# Patient Record
Sex: FEMALE | Race: WHITE | Employment: FULL TIME | URBAN - METROPOLITAN AREA
[De-identification: names, ages, dates, MRNs, and addresses within clinical notes are randomized per-mention and may not be internally consistent; named-entity substitution may affect disease eponyms.]

---

## 2019-09-21 ENCOUNTER — HISTORICAL (OUTPATIENT)
Dept: ADMINISTRATIVE | Facility: HOSPITAL | Age: 54
End: 2019-09-21

## 2022-04-10 ENCOUNTER — HISTORICAL (OUTPATIENT)
Dept: ADMINISTRATIVE | Facility: HOSPITAL | Age: 57
End: 2022-04-10

## 2022-04-24 VITALS
DIASTOLIC BLOOD PRESSURE: 69 MMHG | OXYGEN SATURATION: 98 % | WEIGHT: 114.44 LBS | SYSTOLIC BLOOD PRESSURE: 105 MMHG | HEIGHT: 64 IN | BODY MASS INDEX: 19.54 KG/M2

## 2022-05-03 NOTE — HISTORICAL [facility] CERNER
This is a historical note converted from Alejandrina. Formatting and pictures may have been removed.  Please reference Alejandrina for original formatting and attached multimedia. Chief Complaint  left hand pain from injury at gym. happened in may  History of Present Illness  53-year-old white female presents to clinic complaining of left hand pain. ?Patient states in May she was at the gym?adjusting the seat seat slipped and brushed?against her left hand. ?Patient states there was a little bit of swelling?near the second digit?MCP and fifth digit MCP.? But?nothing significant. ?Patient states there was no bruising. 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 ?However recently the hand has begun to hurt again. ?Patient states the pain is most noticeable when she is grasping items especially grasping a door handle to open a door. ?Pain is not constant it is a dull pain is nonradiating no numbness no tingling?pain is in the same area as the swelling that occurred back in May  Review of Systems  Constitutional_Per HPI  Eye_Per HPI  ENMT_Per HPI  Cardiovascular_Per HPI  Respiratory: Per HPI  Gastrointestinal_Per HPI  Muscular_Per HPI  Neuro: Per HPI  Physical Exam  Vitals & Measurements  T:?36.9? ?C (Oral)? HR:?64(Peripheral)? RR:?16? BP:?105/69? SpO2:?98%?  HT:?162?cm? WT:?51.9?kg? BMI:?19.78?  General_well-developed well-nourished in no acute distress  Musculoskeletal_left hand is?without swelling?erythema?or ecchymosis.? 5 out of 5  strength. ?No no tenderness with palpation of the anatomical snuffbox no pain with axial loading of the thumb. ?Full range of motion of?fingers hand wrist.  Integumentary_no rashes or skin lesions present  Neurologic_ cranial nerves intact, no signs of peripheral neurological deficit, motor/sensory function intact  ?  Assessment/Plan  1.?Left hand pain?M79.642  ?Limit the use with the left hand especially any activity that may trigger pain.  ?When you to rest the hand for the next 1 week. ?Take Advil as needed for pain take it with food so does not upset your stomach. ?If you are experiencing pain ice?the affected area 3-4 times a day for 10 to 15 minutes. ?If after 1 week of conservative treatment you continue to have pain call us notify us we will refer you to orthopedics.? Your x-ray has been sent to a radiologist for an official report we will call you when we receive that report.  Ordered:  XR Hand Left Minimum 3 Views, Routine, 19 9:27:00 CDT, Blunt Trauma, None, Ambulatory, Rad Type, Hand pain, left, Not Scheduled, 19 9:27:00 CDT  ?   Problem List/Past Medical History  Ongoing  No qualifying data  Historical  No qualifying data  Procedure/Surgical History   section   section  right hand sx.  Tonsillectomy   Medications  Hollins Thyroid 15 mg oral tablet, 15 mg= 1 tab(s), Oral, qAM  calcium (as calcium citrate) 250 mg oral tablet, 250 mg= 1 tab(s), Oral, BID  conjugated estrogens 0.625 mg/g vaginal cream with applicator, 1 gm, VAG, qPM  Fish Oil oral capsule, 1 cap(s), Oral, Daily  ibandronate 150 mg oral tablet, 150 mg= 1 tab(s), Oral, qMonth  Iron 100 Plus, Oral, Daily  progesterone 100 mg oral capsule, 100 mg= 1 cap(s), Oral, qPM  Allergies  No Known Medication Allergies  Social History  Abuse/Neglect  No, 2019  Alcohol  Never, 2019  Substance Use  Never, 2019  Tobacco  Never (less than 100 in lifetime), N/A, 2019  Family History  Heart disease.: Father.  Hypertension.: Mother and Father.  Melanoma: Father.  Prostate cancer.: Father.  Health Maintenance  Health Maintenance  ???Pending?(in the next year)  ??? ??OverDue  ??? ? ? ?Alcohol Misuse Screening due??19??and every 1??year(s)  ??? ??Due?  ??? ? ? ?ADL Screening due??19??and every 1??year(s)  ??? ? ? ?Tetanus Vaccine due??09/21/19??and every 10??year(s)  ??? ??Due In Future?  ??? ? ? ?Obesity Screening not due  until??01/01/20??and every 1??year(s)  ???Satisfied?(in the past 1 year)  ??? ??Satisfied?  ??? ? ? ?Blood Pressure Screening on??09/21/19.??Satisfied by Shama Álvarez  ??? ? ? ?Body Mass Index Check on??09/21/19.??Satisfied by Shama Álvarez  ??? ? ? ?Cervical Cancer Screening on??01/14/19.??Satisfied by Agnes Jacobsen  ??? ? ? ?Obesity Screening on??09/21/19.??Satisfied by Shama Álvarez  ?  Diagnostic Results  Negative x-ray